# Patient Record
Sex: MALE | Race: BLACK OR AFRICAN AMERICAN | NOT HISPANIC OR LATINO | URBAN - METROPOLITAN AREA
[De-identification: names, ages, dates, MRNs, and addresses within clinical notes are randomized per-mention and may not be internally consistent; named-entity substitution may affect disease eponyms.]

---

## 2017-05-03 ENCOUNTER — EMERGENCY (EMERGENCY)
Facility: HOSPITAL | Age: 42
LOS: 1 days | Discharge: HOME | End: 2017-05-03
Attending: EMERGENCY MEDICINE | Admitting: EMERGENCY MEDICINE
Payer: COMMERCIAL

## 2017-05-03 VITALS
RESPIRATION RATE: 16 BRPM | SYSTOLIC BLOOD PRESSURE: 122 MMHG | DIASTOLIC BLOOD PRESSURE: 84 MMHG | TEMPERATURE: 98 F | HEART RATE: 72 BPM | OXYGEN SATURATION: 98 %

## 2017-05-03 VITALS
HEART RATE: 76 BPM | DIASTOLIC BLOOD PRESSURE: 79 MMHG | TEMPERATURE: 98 F | RESPIRATION RATE: 18 BRPM | SYSTOLIC BLOOD PRESSURE: 127 MMHG | WEIGHT: 205.03 LBS | OXYGEN SATURATION: 98 %

## 2017-05-03 DIAGNOSIS — R20.0 ANESTHESIA OF SKIN: ICD-10-CM

## 2017-05-03 PROCEDURE — 99283 EMERGENCY DEPT VISIT LOW MDM: CPT

## 2017-05-03 NOTE — ED ADULT NURSE NOTE - CHPI ED SYMPTOMS NEG
no blurred vision/no nausea/no weakness/no confusion/no change in level of consciousness/no dizziness/no vomiting/no fever/no loss of consciousness

## 2017-05-03 NOTE — ED ADULT TRIAGE NOTE - CHIEF COMPLAINT QUOTE
biba from work for left sided numbness since 3pm, denies headache, dizziness, visual changes.  cinncinati scale negative

## 2017-05-03 NOTE — ED PROVIDER NOTE - CRANIAL NERVE AND PUPILLARY EXAM
cranial nerves 2-12 intact/extra-ocular movements intact/normal rectal tone. no saddle anesthesia. 5/5 muscle strength x 4 extremities/tongue is midline

## 2017-05-03 NOTE — ED PROVIDER NOTE - CARDIAC, MLM
Normal rate, regular rhythm.  Heart sounds S1, S2.  No murmurs, rubs or gallops. equal pulses b/l. no carotid bruits or thrills

## 2017-05-03 NOTE — ED PROVIDER NOTE - MEDICAL DECISION MAKING DETAILS
Pt p/w numbness. On exam pt w/ normal sensation to light touch and pinprick, normal strength, and non focal neuro exam. On exam pt reports have resolved and he feels "regular." Attempted to contact stand-up MRI for pt's results - MRI lumber 2016 mild disc bulging, normal cord. Normal MRI brain. No cervical MRI. Advised f/u PCP / spinal surgeon. RTC for persistent worsening sx, weakness, or other concerning sx

## 2017-05-03 NOTE — ED PROVIDER NOTE - OBJECTIVE STATEMENT
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 Pt reports the numbness started in the L thigh and has radiated down to the foot, and then progressed to the upper arm only. Sx have been persistent since onset. Pt reports he has been getting short-lived numbness for some time. No trunk numbness. No CP, SOB, back pain, abdominal pain, or neck pain. No HA, blurred vision, slurred speech, or focal weakness. No fecal or urinary incontinence or retention. No saddle anesthesia. No f/c. No IVDA. No recent trauma or prolonged extension. Pt reports has recently had MRI of lumbar and cervical spine at [facility].

## 2017-09-02 NOTE — ED PROVIDER NOTE - DIAGNOSIS COUNSELING, MDM
conducted a detailed discussion... I had a detailed discussion with the patient and/or guardian regarding the historical points, exam findings, and any diagnostic results supporting the discharge/admit diagnosis. 02-Sep-2017 19:41
